# Patient Record
Sex: MALE | Race: WHITE | HISPANIC OR LATINO | URBAN - NONMETROPOLITAN AREA
[De-identification: names, ages, dates, MRNs, and addresses within clinical notes are randomized per-mention and may not be internally consistent; named-entity substitution may affect disease eponyms.]

---

## 2024-06-25 ENCOUNTER — APPOINTMENT (OUTPATIENT)
Dept: URGENT CARE | Facility: CLINIC | Age: 44
End: 2024-06-25

## 2025-03-25 ENCOUNTER — APPOINTMENT (EMERGENCY)
Dept: RADIOLOGY | Facility: HOSPITAL | Age: 45
End: 2025-03-25
Payer: COMMERCIAL

## 2025-03-25 ENCOUNTER — HOSPITAL ENCOUNTER (EMERGENCY)
Facility: HOSPITAL | Age: 45
Discharge: HOME | End: 2025-03-25
Attending: EMERGENCY MEDICINE
Payer: COMMERCIAL

## 2025-03-25 VITALS
OXYGEN SATURATION: 98 % | WEIGHT: 145.72 LBS | RESPIRATION RATE: 16 BRPM | TEMPERATURE: 99.1 F | SYSTOLIC BLOOD PRESSURE: 152 MMHG | DIASTOLIC BLOOD PRESSURE: 100 MMHG | HEART RATE: 72 BPM

## 2025-03-25 DIAGNOSIS — M79.673 FOOT PAIN: Primary | ICD-10-CM

## 2025-03-25 PROCEDURE — 99283 EMERGENCY DEPT VISIT LOW MDM: CPT

## 2025-03-25 PROCEDURE — 73630 X-RAY EXAM OF FOOT: CPT

## 2025-03-25 PROCEDURE — 99283 EMERGENCY DEPT VISIT LOW MDM: CPT | Performed by: EMERGENCY MEDICINE

## 2025-03-25 NOTE — ED PROVIDER NOTES
Time reflects when diagnosis was documented in both MDM as applicable and the Disposition within this note       Time User Action Codes Description Comment    3/25/2025  3:43 PM Seymour Paredes Add [M79.673] Foot pain           ED Disposition       ED Disposition   Discharge    Condition   Stable    Date/Time   Tue Mar 25, 2025  3:43 PM    Comment   Juve Gallagher discharge to home/self care.                   Assessment & Plan       Medical Decision Making  44-year-old male presents to the emergency department with foot pain, presentation likely due to callus from injury months ago, will provide patient with ambulatory referral to podiatry so he can follow-up with them outpatient.  Strict return precautions given.  Patient understands and agrees with treatment.     Amount and/or Complexity of Data Reviewed  Radiology: ordered.             Medications - No data to display    ED Risk Strat Scores                            SBIRT 22yo+      Flowsheet Row Most Recent Value   Initial Alcohol Screen: US AUDIT-C     1. How often do you have a drink containing alcohol? 0 Filed at: 03/25/2025 1442   2. How many drinks containing alcohol do you have on a typical day you are drinking?  0 Filed at: 03/25/2025 1442   3a. Male UNDER 65: How often do you have five or more drinks on one occasion? 0 Filed at: 03/25/2025 1442   Audit-C Score 0 Filed at: 03/25/2025 1442   NICOLE: How many times in the past year have you...    Used an illegal drug or used a prescription medication for non-medical reasons? Never Filed at: 03/25/2025 1442                            History of Present Illness       Chief Complaint   Patient presents with    Foot Pain     Reports back in January he kicked something and about a month and a half ago, he feels like he is stepping on a nail. Concerned for a splinter in his foot.        History reviewed. No pertinent past medical history.   History reviewed. No pertinent surgical history.   History reviewed. No  pertinent family history.   Social History     Tobacco Use    Smoking status: Never    Smokeless tobacco: Never   Vaping Use    Vaping status: Never Used   Substance Use Topics    Alcohol use: Never    Drug use: Never      E-Cigarette/Vaping    E-Cigarette Use Never User       E-Cigarette/Vaping Substances      I have reviewed and agree with the history as documented.     44-year-old male presents to the emergency department with complaint of right-sided foot pain.  Patient states that in January, he stubbed the edge of his foot on a wooden stair, he was concerned that he had a splinter, patient states that for the past 3 months, he has had some soreness in the right lateral aspect of his foot.  Patient did not seek any treatment, nor go to a podiatrist.  Patient denies any weakness, numbness.  On initial evaluation, patient does have a callus at the right lateral aspect of his foot.  Patient has no other medical complaints, no obvious injury, or deformity.          Review of Systems   Constitutional:  Negative for chills and fever.   HENT:  Negative for ear pain and sore throat.    Eyes:  Negative for pain and visual disturbance.   Respiratory:  Negative for cough and shortness of breath.    Cardiovascular:  Negative for chest pain and palpitations.   Gastrointestinal:  Negative for abdominal pain and vomiting.   Genitourinary:  Negative for dysuria and hematuria.   Musculoskeletal:  Negative for arthralgias and back pain.   Skin:  Negative for color change and rash.   Neurological:  Negative for seizures and syncope.   All other systems reviewed and are negative.          Objective       ED Triage Vitals [03/25/25 1439]   Temperature Pulse Blood Pressure Respirations SpO2 Patient Position - Orthostatic VS   99.1 °F (37.3 °C) 72 152/100 16 98 % Sitting      Temp Source Heart Rate Source BP Location FiO2 (%) Pain Score    Temporal Monitor Left arm -- 8      Vitals      Date and Time Temp Pulse SpO2 Resp BP Pain  Score FACES Pain Rating User   03/25/25 1439 99.1 °F (37.3 °C) 72 98 % 16 152/100 8 did not take any pain medication. -- BF            Physical Exam  Vitals and nursing note reviewed.   Constitutional:       General: He is not in acute distress.     Appearance: He is well-developed.   HENT:      Head: Normocephalic and atraumatic.   Eyes:      Conjunctiva/sclera: Conjunctivae normal.   Cardiovascular:      Rate and Rhythm: Normal rate and regular rhythm.      Heart sounds: No murmur heard.  Pulmonary:      Effort: Pulmonary effort is normal. No respiratory distress.      Breath sounds: Normal breath sounds.   Abdominal:      Palpations: Abdomen is soft.      Tenderness: There is no abdominal tenderness.   Musculoskeletal:         General: Tenderness present. No swelling.      Cervical back: Neck supple.   Skin:     General: Skin is warm and dry.      Capillary Refill: Capillary refill takes less than 2 seconds.   Neurological:      Mental Status: He is alert.   Psychiatric:         Mood and Affect: Mood normal.         Results Reviewed       None            XR foot 2 views RIGHT    (Results Pending)       Procedures    ED Medication and Procedure Management   None     Patient's Medications    No medications on file       ED SEPSIS DOCUMENTATION   Time reflects when diagnosis was documented in both MDM as applicable and the Disposition within this note       Time User Action Codes Description Comment    3/25/2025  3:43 PM Seymour Paredes Add [M79.673] Foot pain                  Seymour Paredes DO  03/25/25 0501

## 2025-03-25 NOTE — DISCHARGE INSTRUCTIONS
You were seen in the emergency department for foot pain, we provided you with an ambulatory referral to podiatry, please call their office and schedule an appointment.  They are expecting your phone call.  If your symptoms worsen or persist, please return to the emergency department immediately.

## 2025-03-31 ENCOUNTER — OFFICE VISIT (OUTPATIENT)
Dept: PODIATRY | Facility: CLINIC | Age: 45
End: 2025-03-31

## 2025-03-31 VITALS
BODY MASS INDEX: 24.16 KG/M2 | HEART RATE: 70 BPM | WEIGHT: 145 LBS | RESPIRATION RATE: 16 BRPM | HEIGHT: 65 IN | TEMPERATURE: 98.6 F | OXYGEN SATURATION: 99 %

## 2025-03-31 DIAGNOSIS — M60.271 FOREIGN BODY GRANULOMA OF SOFT TISSUE OF RIGHT FOOT: Primary | ICD-10-CM

## 2025-03-31 DIAGNOSIS — M79.673 FOOT PAIN: ICD-10-CM

## 2025-03-31 DIAGNOSIS — L84 CALLUS OF FOOT: ICD-10-CM

## 2025-03-31 RX ORDER — ALBUTEROL SULFATE 90 UG/1
2 INHALANT RESPIRATORY (INHALATION) EVERY 6 HOURS PRN
COMMUNITY
Start: 2025-02-20

## 2025-03-31 RX ORDER — ADALIMUMAB-ADAZ 40 MG/.4ML
40 INJECTION, SOLUTION SUBCUTANEOUS
COMMUNITY
Start: 2025-03-18

## 2025-03-31 NOTE — PROGRESS NOTES
Name: Juve Gallagher      : 1980      MRN: [id number]  Encounter Provider: Vicky Deal DPM  Encounter Date: 3/31/2025   [facility]  :  Assessment & Plan  Foot pain    Orders:    Ambulatory Referral to Podiatry    Callus of foot         Foreign body granuloma of soft tissue of right foot           Imaging Reviewed at this visit (I personally reviewed/independently interpreted images and reports in PACS)  XR right foot WB 3v today's date: No acute osseous abnormalities noted.  No acute fracture or dislocation noted.   Cavus foot type     IMPRESSION:  RLE cavus foot type  Callus formation  Granuloma submet 5    PLAN:  I reviewed clinical exam and radiographic imaging (XR) with patient in detail today. I have discussed with the patient the pathophysiology of this diagnosis and reviewed how the examination correlates with this diagnosis.  ED note reviewed 3/25/2025  Painful callus submet 5 on the right foot including poor keratoses, plantar wart, granuloma and foreign body granuloma.  Lesion appears to be porokeratosis with granuloma.  Treatment options reviewed including conservative padding, extension, chemical treatment, and neglect  Patient would like to proceed with debridement and chemical cauterization with Cantharone  Cantharone treatment reviewed at length including risks and benefits.  Area debrided using #15 blade down to level of granular lesion.  No foreign body appreciated.  Cantharone applied with moleskin covering.  Patient advised to wash the area off in 6 hours.  Patient placed timer on the phone.  Patient counseled Cantharone causes blistering and irritation in the area.  He will apply triple antibiotic and Band-Aid if this occurs.  Patient counseled on clinical signs of infection will contact the office if questions or concerns arise  Multiple offloading pads provided for the area, patient counseled on where he can obtain more  Patient counseled  "that may take several visits for eradication of lesion  Follow-up 1 to 2 weeks for further evaluation and management      Lesion Destruction    Date/Time: 3/31/2025 8:15 AM    Performed by: Vicky Deal DPM  Authorized by: Vicky Deal DPM  Universal Protocol:  procedure performed by consultantConsent: Verbal consent obtained.  Risks and benefits: risks, benefits and alternatives were discussed  Consent given by: patient  Time out: Immediately prior to procedure a \"time out\" was called to verify the correct patient, procedure, equipment, support staff and site/side marked as required.  Patient understanding: patient states understanding of the procedure being performed  Patient identity confirmed: verbally with patient    Procedure Details - Lesion Destruction:     Number of Lesions:  1  Lesion 1:     Body area:  Lower extremity    Lower extremity location:  R foot (Submetatarsal head 5)    Initial size (mm):  25    Final defect size (mm):  25    Malignancy: benign hyperkeratotic lesion      Destruction method: chemical removal       Discussed options and the patient wished to proceed with chemical cauterization.  Verbal consent was obtained from the patient.  Lesion and any surround hyperkeratotic skin lesions were debrided to a level of pinpoint bleeding using a sterile #15 scapel.  The lesions were then cauterized with Cantharidin.  An occlusive dressing was applied to the areas.  Instructed to remove the dressing 6 hours after treatment and wash the area with warm soapy water.  If irritation occurs patient was instructed to apply triple antibiotic and adhesive dressing.  The patient tolerated the procedure well and without complications.  Patient was educated that blistering may occur from treatment.  If significant irritation, redness, swelling, drainage, significant pain occurs patient will contact the office immediately the patient will return in 2 weeks for follow-up.        History of Present " "Illness   HPI  Juve Gallagher is a 44 y.o. male who presents for evaluation and management of painful callusing on his right foot underneath his fifth metatarsal head.  Patient states that he had gotten a splinter in the area several months ago and the scarring created a callus.  The callus has gotten progressively more painful.  He did not know how to treated and presented to the ED for evaluation.  No clinical signs of infection and x-ray negative.  He was referred to podiatry for further evaluation and management      Review of Systems   Constitutional:  Negative for chills and fever.   Respiratory:  Negative for shortness of breath.    Cardiovascular:  Negative for leg swelling.   Musculoskeletal:  Positive for arthralgias and gait problem.   Skin:  Negative for wound.          Objective   Pulse 70   Temp 98.6 °F (37 °C)   Resp 16   Ht 5' 5\" (1.651 m)   Wt 65.8 kg (145 lb)   SpO2 99%   BMI 24.13 kg/m²      Physical Exam  Constitutional:       Appearance: He is not ill-appearing.   Cardiovascular:      Pulses: Normal pulses.   Musculoskeletal:      Comments: RLE cavus foot type with palpable fifth metatarsal head   Skin:     Capillary Refill: Capillary refill takes less than 2 seconds.      Comments: Right foot submet 5 2 small porokeratosis with underlying granular tissue laterally.  No foreign body.  No erythema, no edema, no drainage, no crepitus, no fluctuance, no clinical signs of infection   Neurological:      Sensory: No sensory deficit.           "

## 2025-04-14 ENCOUNTER — OFFICE VISIT (OUTPATIENT)
Dept: PODIATRY | Facility: CLINIC | Age: 45
End: 2025-04-14
Payer: COMMERCIAL

## 2025-04-14 VITALS
WEIGHT: 145 LBS | HEIGHT: 65 IN | HEART RATE: 64 BPM | OXYGEN SATURATION: 98 % | RESPIRATION RATE: 16 BRPM | BODY MASS INDEX: 24.16 KG/M2 | TEMPERATURE: 98.8 F

## 2025-04-14 DIAGNOSIS — B07.0 PLANTAR WARTS: ICD-10-CM

## 2025-04-14 DIAGNOSIS — M60.271 FOREIGN BODY GRANULOMA OF SOFT TISSUE OF RIGHT FOOT: Primary | ICD-10-CM

## 2025-04-14 PROCEDURE — 99212 OFFICE O/P EST SF 10 MIN: CPT | Performed by: PODIATRIST

## 2025-04-14 NOTE — PROGRESS NOTES
Name: Juve Gallagher      : 1980      MRN: [id number]  Encounter Provider: Vicky Deal DPM  Encounter Date: 2025   [facility]  :  Assessment & Plan  Foreign body granuloma of soft tissue of right foot         Plantar warts         IMPRESSION:  RLE cavus foot type  Callus formation  Granuloma submet 5     PLAN:  Patient tolerated procedure well  Painful callus submet 5 on the right foot including poor keratoses, plantar wart, granuloma and foreign body granuloma.  Lesion appears to be porokeratosis with granuloma.  Treatment options reviewed including conservative padding, extension, chemical treatment, and neglect  Patient would like to proceed with debridement and chemical cauterization with Cantharone, patient may want to proceed with excision at next exam if majority of lesion is still present  Cantharone treatment reviewed at length including risks and benefits.  Area debrided using #15 blade down to level of granular lesion.  No foreign body appreciated.  Cantharone applied with moleskin covering.  Patient advised to wash the area off in 6 hours.  Patient placed timer on the phone.  Patient counseled Cantharone causes blistering and irritation in the area.  He will apply triple antibiotic and Band-Aid if this occurs.  Patient counseled on clinical signs of infection will contact the office if questions or concerns arise  Multiple offloading pads provided for the area, patient counseled on where he can obtain more  Patient counseled that may take several visits for eradication of lesion  Follow-up 1 to 2 weeks for further evaluation and management    Lesion Destruction    Date/Time: 2025 7:45 AM    Performed by: Vicky Deal DPM  Authorized by: Vicky Deal DPM  Universal Protocol:  procedure performed by consultantConsent: Verbal consent obtained.  Risks and benefits: risks, benefits and alternatives were discussed  Consent given by:  "patient  Time out: Immediately prior to procedure a \"time out\" was called to verify the correct patient, procedure, equipment, support staff and site/side marked as required.  Patient understanding: patient states understanding of the procedure being performed  Patient identity confirmed: verbally with patient    Procedure Details - Lesion Destruction:     Number of Lesions:  1  Lesion 1:     Body area:  Lower extremity    Lower extremity location:  R foot (Submet 5)    Initial size (mm):  25    Final defect size (mm):  16    Malignancy: benign hyperkeratotic lesion      Destruction method: chemical removal          History of Present Illness   HPI  Juve Gallagher is a 44 y.o. male who presents tolerated procedure well.  No clinical signs of infection.  Still has pain due to callusing at area of treatment      Review of Systems  Constitutional:  Negative for chills and fever.   Respiratory:  Negative for shortness of breath.    Cardiovascular:  Negative for leg swelling.   Musculoskeletal:  Positive for arthralgias and gait problem.   Skin:  Negative for wound.        Objective   Pulse 64   Temp 98.8 °F (37.1 °C)   Resp 16   Ht 5' 5\" (1.651 m)   Wt 65.8 kg (145 lb)   SpO2 98%   BMI 24.13 kg/m²      Physical Exam  Constitutional:       Appearance: He is not ill-appearing.   Cardiovascular:      Pulses: Normal pulses.   Musculoskeletal:      Comments: RLE cavus foot type with palpable fifth metatarsal head   Skin:     Capillary Refill: Capillary refill takes less than 2 seconds.      Comments: Right foot submet 5 2 small porokeratosis with underlying granular tissue.  Pinpoint bleeding with debridement.  No foreign body.  No erythema, no edema, no drainage, no crepitus, no fluctuance, no clinical signs of infection   Neurological:      Sensory: No sensory deficit.       "

## 2025-05-02 ENCOUNTER — OFFICE VISIT (OUTPATIENT)
Dept: PODIATRY | Facility: CLINIC | Age: 45
End: 2025-05-02
Payer: COMMERCIAL

## 2025-05-02 VITALS
BODY MASS INDEX: 24.83 KG/M2 | WEIGHT: 149 LBS | HEIGHT: 65 IN | RESPIRATION RATE: 16 BRPM | HEART RATE: 82 BPM | OXYGEN SATURATION: 92 % | TEMPERATURE: 97 F

## 2025-05-02 DIAGNOSIS — M60.271 FOREIGN BODY GRANULOMA OF SOFT TISSUE OF RIGHT FOOT: Primary | ICD-10-CM

## 2025-05-02 DIAGNOSIS — B07.0 PLANTAR WARTS: ICD-10-CM

## 2025-05-02 DIAGNOSIS — L84 CALLUS OF FOOT: ICD-10-CM

## 2025-05-02 PROCEDURE — 11055 PARING/CUTG B9 HYPRKER LES 1: CPT | Performed by: PODIATRIST

## 2025-05-02 PROCEDURE — 99212 OFFICE O/P EST SF 10 MIN: CPT | Performed by: PODIATRIST

## 2025-05-02 NOTE — PROGRESS NOTES
"Name: Juve Gallagher      : 1980      MRN: [id number]  Encounter Provider: Vicky Deal DPM  Encounter Date: 2025   [facility]  :  Assessment & Plan  Foreign body granuloma of soft tissue of right foot         Plantar warts         Callus of foot         IMPRESSION:  RLE cavus foot type  Callus formation  Granuloma submet 5       PLAN:  Right submet 5 granuloma, patient states that pain significantly improved after last treatment  Area debrided today with resolution of lesion.  Returning skin tension lines noted underneath.  #15 blade used to remove nonviable hyperkeratotic tissue down to healthy tissue and skin Patient counseled to continue with moisturizers to the area  Continue with pumice stone to prevent callus buildup  Continue with supportive shoe gears to prevent pressure on the area  Use padding as needed  Follow-up as needed    Lesion Destruction    Date/Time: 2025 10:00 AM    Performed by: Vicky Deal DPM  Authorized by: Vicky Deal DPM  Universal Protocol:  procedure performed by consultantConsent: Verbal consent obtained.  Risks and benefits: risks, benefits and alternatives were discussed  Consent given by: patient  Time out: Immediately prior to procedure a \"time out\" was called to verify the correct patient, procedure, equipment, support staff and site/side marked as required.  Patient understanding: patient states understanding of the procedure being performed  Patient identity confirmed: verbally with patient    Procedure Details - Lesion Destruction:     Number of Lesions:  1  Lesion 1:     Body area:  Lower extremity    Lower extremity location:  R foot (sub met 5)    Initial size (mm):  25    Final defect size (mm):  0    Malignancy: benign hyperkeratotic lesion      Destruction method: scissors used for extraction          History of Present Illness   HPI  Juve Gallagher is a 44 y.o. male who presents for continued " "evaluation of right foot painful granuloma with evidence of plantar wart.  Patient states that initially after the last treatment he had blistering and pain however it subsided and now his pain is almost resolved.  He has been wearing offloading pads.  He denies any residual redness, swelling, or drainage from the area      Review of Systems    Constitutional:  Negative for chills and fever.   Respiratory:  Negative for shortness of breath.    Cardiovascular:  Negative for leg swelling.   Musculoskeletal:  Positive for arthralgias and gait problem.   Skin:  Negative for wound.        Objective   Pulse 82   Temp (!) 97 °F (36.1 °C) (Temporal)   Resp 16   Ht 5' 5\" (1.651 m)   Wt 67.6 kg (149 lb)   SpO2 92%   BMI 24.79 kg/m²      Physical Exam  Constitutional:       Appearance: He is not ill-appearing.   Cardiovascular:      Pulses: Normal pulses.   Musculoskeletal:      Comments: RLE cavus foot type with palpable fifth metatarsal head   Skin:     Capillary Refill: Capillary refill takes less than 2 seconds.      Comments: Right foot submet 5  small hyperkeratotic lesion.  After debridement no underlying lesion, no pinpoint bleeding, returning skin tension lines noted, no erythema, no edema, no drainage  Neurological:      Sensory: No sensory deficit.       "